# Patient Record
Sex: MALE | Race: WHITE | NOT HISPANIC OR LATINO | Employment: OTHER | URBAN - METROPOLITAN AREA
[De-identification: names, ages, dates, MRNs, and addresses within clinical notes are randomized per-mention and may not be internally consistent; named-entity substitution may affect disease eponyms.]

---

## 2017-02-28 ENCOUNTER — OFFICE VISIT (OUTPATIENT)
Dept: SURGERY | Facility: CLINIC | Age: 47
End: 2017-02-28

## 2017-02-28 VITALS
WEIGHT: 227.9 LBS | DIASTOLIC BLOOD PRESSURE: 90 MMHG | HEART RATE: 65 BPM | OXYGEN SATURATION: 98 % | HEIGHT: 75 IN | SYSTOLIC BLOOD PRESSURE: 150 MMHG | BODY MASS INDEX: 28.34 KG/M2

## 2017-02-28 DIAGNOSIS — R10.31 RIGHT LOWER QUADRANT ABDOMINAL PAIN: Primary | ICD-10-CM

## 2017-02-28 PROCEDURE — 99203 OFFICE O/P NEW LOW 30 MIN: CPT | Performed by: SURGERY

## 2017-02-28 NOTE — PROGRESS NOTES
Subjective   Polo Malik is a 46 y.o. male who presents to the office for right abdominal pain.    History of Present Illness     The patient has a [employer] business and performs strenuous activity routinely.  He was recently working and developed acute pain in the right abdomen.  He had some associated swelling which is now resolved.  There was no change in his bowel or bladder function.  He had no nausea or vomiting nor any change in his appetite.  He has not had any diarrhea nor any constipation.  The pain has continued in the right abdomen just below the umbilicus and is focal in nature.  It is worse with certain activities.  He has not noticed a bulge.    Review of Systems   Constitutional: Negative for activity change, appetite change, fatigue and fever.   Respiratory: Negative for chest tightness and shortness of breath.    Cardiovascular: Negative for chest pain and palpitations.   Gastrointestinal: Positive for abdominal pain. Negative for blood in stool, constipation, diarrhea, nausea and vomiting.   Endocrine: Negative for cold intolerance and heat intolerance.   Genitourinary: Negative for dysuria and flank pain.   Musculoskeletal: Negative for arthralgias and myalgias.   Neurological: Negative for dizziness and light-headedness.   Hematological: Negative for adenopathy. Does not bruise/bleed easily.   Psychiatric/Behavioral: Negative for agitation and confusion.     Past Medical History   Diagnosis Date   • Abdominal pain      Past Surgical History   Procedure Laterality Date   • [place] tooth extraction       Family History   Problem Relation Age of Onset   • Heart disease Father      Social History     Social History   • Marital status:      Spouse name: N/A   • Number of children: N/A   • Years of education: N/A     Occupational History   • landscape      Social History Main Topics   • Smoking status: Never Smoker   • Smokeless tobacco: Never Used   • Alcohol use 1.8 oz/week     3  Standard drinks or equivalent per week      Comment: twice per week   • Drug use: No   • Sexual activity: Defer     Other Topics Concern   • Not on file     Social History Narrative   • No narrative on file       Objective   Physical Exam   Constitutional: He is oriented to person, place, and time. He appears well-developed and well-nourished.  Non-toxic appearance.   Eyes: EOM are normal. No scleral icterus.   Pulmonary/Chest: Effort normal. No respiratory distress.   Abdominal: Soft. Normal appearance. There is tenderness (tenderness in the right lower quadrant at the margin of the rectus abdominis muscle just below the umbilicus with no palpable hernia) in the right lower quadrant.   Neurological: He is alert and oriented to person, place, and time.   Skin: Skin is warm and dry.   Psychiatric: He has a normal mood and affect. His behavior is normal. Judgment and thought content normal.       Assessment/Plan       The encounter diagnosis was Right lower quadrant abdominal pain.    The patient has right lower quadrant abdominal pain in the location expected for a Spigelian hernia.  There is no palpable hernia present.  This was discussed at length with the patient.  He may have abdominal wall strain.  He was offered a diagnostic laparoscopy to assess for a Spigelian hernia.  He would like to avoid surgery and will follow the area clinically and return to the office if the pain continues.